# Patient Record
Sex: FEMALE | URBAN - METROPOLITAN AREA
[De-identification: names, ages, dates, MRNs, and addresses within clinical notes are randomized per-mention and may not be internally consistent; named-entity substitution may affect disease eponyms.]

---

## 2023-10-26 ENCOUNTER — OFFICE VISIT (OUTPATIENT)
Dept: ORTHOPEDIC SURGERY | Age: 53
End: 2023-10-26
Payer: COMMERCIAL

## 2023-10-26 DIAGNOSIS — M25.512 LEFT SHOULDER PAIN, UNSPECIFIED CHRONICITY: Primary | ICD-10-CM

## 2023-10-26 DIAGNOSIS — M67.912 ROTATOR CUFF DISORDER, LEFT: ICD-10-CM

## 2023-10-26 PROCEDURE — 99203 OFFICE O/P NEW LOW 30 MIN: CPT | Performed by: ORTHOPAEDIC SURGERY

## 2023-10-26 RX ORDER — ELETRIPTAN HYDROBROMIDE 40 MG/1
TABLET, FILM COATED ORAL
COMMUNITY
Start: 2023-10-23

## 2023-10-26 RX ORDER — SUMATRIPTAN 20 MG/1
20 SPRAY NASAL DAILY PRN
COMMUNITY
Start: 2023-10-18

## 2023-10-27 ENCOUNTER — TELEPHONE (OUTPATIENT)
Dept: ORTHOPEDIC SURGERY | Age: 53
End: 2023-10-27

## 2023-10-27 NOTE — TELEPHONE ENCOUNTER
General Question     Subject: PAPER WORK   Patient and /or Facility Request: Rosalva Gusman  Contact Number: [phone]    PT CALLING IN REGARDING NEEDING PAPER SENT OVER FROM 10- TO HER EMAIL. PT EMAIL WAS INPUT INCORRECTLY ON HER CHART. CORRECT EMAIL: [email or web address]    PLEASE CALL BACK T AT THE ABOVE NUMBER.

## 2023-10-27 NOTE — PROGRESS NOTES
ago.    Impression:  Encounter Diagnoses   Name Primary? Left shoulder pain, unspecified chronicity Yes    Rotator cuff disorder, left        Office Procedures:  Orders Placed This Encounter   Procedures    XR SHOULDER LEFT (MIN 2 VIEWS)     Standing Status:   Future     Number of Occurrences:   1     Standing Expiration Date:   10/26/2024     Order Specific Question:   Reason for exam:     Answer:   left shoulder pain    MRI SHOULDER LEFT WO CONTRAST     Standing Status:   Future     Standing Expiration Date:   10/26/2024     Order Specific Question:   Reason for exam:     Answer:   Proscan       Plan:   We discussed with Navi Christopher that due to the recurrence of her symptoms we will obtain an updated MRI to assess the integrity of her rotator cuff and propagation of her previously discovered rotator cuff tear. Would like to see the patient back in clinic to discuss results of her MRI and develop further plan in roughly 2 weeks. Navi Christopher will follow up in 2 weeks and/or as needed. She was in agreement with this plan and all questions were answered to her satisfaction. She was encouraged to call with any questions. 10/26/2023  8:07 PM      Jazmine Gonzalez DO  Orthopaedic Sports Medicine Fellow    This dictation was performed with a verbal recognition program [facility]) and it was checked for errors. It is possible that there are still dictated errors within this office note. If so, please bring any errors to my attention for an addendum. All efforts were made to ensure that this office note is accurate.  _______________  I was physically present and personally supervised the Orthopaedic Sports Medicine Fellow in the evaluation and development of a treatment plan for this patient. I personally interviewed the patient and performed a physical examination. In addition, I discussed the patient's condition and treatment options with them.  I have also reviewed and agree with the past medical,

## 2023-11-16 ENCOUNTER — OFFICE VISIT (OUTPATIENT)
Dept: ORTHOPEDIC SURGERY | Age: 53
End: 2023-11-16
Payer: COMMERCIAL

## 2023-11-16 VITALS — HEIGHT: 62 IN | BODY MASS INDEX: 29.44 KG/M2 | WEIGHT: 160 LBS

## 2023-11-16 DIAGNOSIS — M75.112 INCOMPLETE TEAR OF LEFT ROTATOR CUFF, UNSPECIFIED WHETHER TRAUMATIC: ICD-10-CM

## 2023-11-16 DIAGNOSIS — M75.22 BICEPS TENDINITIS OF LEFT UPPER EXTREMITY: Primary | ICD-10-CM

## 2023-11-16 PROCEDURE — 99213 OFFICE O/P EST LOW 20 MIN: CPT | Performed by: ORTHOPAEDIC SURGERY

## 2023-11-16 NOTE — PROGRESS NOTES
comfortably in our office in acute distress. Skin:  There are no skin lesions, cellulitis, or extreme edema. The patient has warm and well-perfused Bilateral upper extremities with brisk capillary refill. Eyes: Extra-ocular muscles intact  Mouth: Oral mucosa moist. No perioral lesions  Pulm: Respirations unlabored and regular. Neuro: Alert and oriented x3    Left shoulder Exam:  Inspection: No gross deformities, no signs of infection. Palpation:  There is negative crepitus. there is tenderness of over the rotator cuff footprint, non tender of the bicipital groove, AC joint and posterior glenohumeral humeral joint. Active Range of Motion: Forward elevation of 150, abduction of 140, external rotation with elbow at the side 45, internal rotation to the back is T10     Passive Range of Motion: Deferred     Strength: External rotation with resistance with elbow at the side +4/5, internal rotation with resistance with elbow at the side 5/5, Champagne toast testing +4/5, Jobes test +4/5     Special Tests:no pain over the bicipital groove with CBA,  negative crossarm no Armond muscle deformity. Neurovascular: Sensation to light touch is intact, no motor deficits, palpable radial pulses 2+    Additional Examinations:    Examination of the contralateral extremity does not show any tenderness, deformity or injury. Range of motion is unremarkable. There is no gross instability. There are no rashes, ulcerations or lesions. Strength and tone are normal.      Radiographic:  MRI left shoulder 11/10/2023  CONCLUSION:   1. Supraspinatus focal partial-width full-thickness tear distal critical zone mid fibers 3 x   8mm. Hypertrophic supraspinatus and moderate infraspinatus tendinosis. Associated   subacromial-subdeltoid bursitis. 2. Subacromial osseous and ligamentous impingement contributory. 3. Biceps pulley mechanism insufficiency.   Partially medially subluxed confluent hypertrophic   tendinosis of the

## 2024-03-21 ENCOUNTER — PREP FOR PROCEDURE (OUTPATIENT)
Dept: OPHTHALMOLOGY | Age: 54
End: 2024-03-21

## 2024-03-21 RX ORDER — PROPARACAINE HYDROCHLORIDE 5 MG/ML
1 SOLUTION/ DROPS OPHTHALMIC ONCE
Status: CANCELLED | OUTPATIENT
Start: 2024-03-21 | End: 2024-03-21

## 2024-03-21 RX ORDER — PILOCARPINE HYDROCHLORIDE 10 MG/ML
1 SOLUTION/ DROPS OPHTHALMIC
Status: CANCELLED | OUTPATIENT
Start: 2024-03-21 | End: 2024-03-21

## 2024-03-21 RX ORDER — BRIMONIDINE TARTRATE 2 MG/ML
1 SOLUTION/ DROPS OPHTHALMIC ONCE
Status: CANCELLED | OUTPATIENT
Start: 2024-03-21 | End: 2024-03-21

## 2024-03-21 NOTE — PRE-PROCEDURE INSTRUCTIONS
[address]  [phone]        Pre-Op Phone Call:     Patient Name: Alie Saucedo     Telephone Information:   Mobile [phone]     Home phone:  [phone]    Surgery Time:        Arrival Time:  0645     Left extended Message:  Yes spoke with patient                  Electronically signed by:  Aleshia Walker RN at 3/21/2024 9:42 AM

## 2024-03-22 ENCOUNTER — HOSPITAL ENCOUNTER (OUTPATIENT)
Dept: SURGERY | Age: 54
Discharge: HOME | End: 2024-03-22
Payer: COMMERCIAL

## 2024-03-22 VITALS — SYSTOLIC BLOOD PRESSURE: 134 MMHG | DIASTOLIC BLOOD PRESSURE: 99 MMHG

## 2024-03-22 PROCEDURE — 2500000003 HC RX 250 WO HCPCS: Performed by: OPHTHALMOLOGY

## 2024-03-22 PROCEDURE — 66821 AFTER CATARACT LASER SURGERY: CPT

## 2024-03-22 PROCEDURE — 66761 REVISION OF IRIS: CPT

## 2024-03-22 PROCEDURE — 6370000000 HC RX 637 (ALT 250 FOR IP): Performed by: OPHTHALMOLOGY

## 2024-03-22 RX ORDER — PROPARACAINE HYDROCHLORIDE 5 MG/ML
1 SOLUTION/ DROPS OPHTHALMIC ONCE
Status: COMPLETED | OUTPATIENT
Start: 2024-03-22 | End: 2024-03-22

## 2024-03-22 RX ORDER — BRIMONIDINE TARTRATE 2 MG/ML
1 SOLUTION/ DROPS OPHTHALMIC ONCE
Status: COMPLETED | OUTPATIENT
Start: 2024-03-22 | End: 2024-03-22

## 2024-03-22 RX ORDER — PILOCARPINE HYDROCHLORIDE 10 MG/ML
1 SOLUTION/ DROPS OPHTHALMIC
Status: COMPLETED | OUTPATIENT
Start: 2024-03-22 | End: 2024-03-22

## 2024-03-22 RX ADMIN — PILOCARPINE HYDROCHLORIDE 1 DROP: 10 SOLUTION/ DROPS OPHTHALMIC at 07:47

## 2024-03-22 RX ADMIN — BRIMONIDINE TARTRATE 1 DROP: 2 SOLUTION OPHTHALMIC at 08:25

## 2024-03-22 RX ADMIN — PILOCARPINE HYDROCHLORIDE 1 DROP: 10 SOLUTION/ DROPS OPHTHALMIC at 07:48

## 2024-03-22 RX ADMIN — PILOCARPINE HYDROCHLORIDE 1 DROP: 10 SOLUTION/ DROPS OPHTHALMIC at 07:49

## 2024-03-22 RX ADMIN — PROPARACAINE HYDROCHLORIDE 1 DROP: 5 SOLUTION/ DROPS OPHTHALMIC at 07:47

## 2024-03-22 NOTE — PROGRESS NOTES
Patient: Alie Saucedo  1970, 53 y.o./female    Ambulatory to laser room.  Left eye marked and numbed by Dr. Walsh.  Laser procedure done and tolerated well by patient.  Post  instructions given to Alie by provider.      Power setting was 3.5        # of shots was 48    Total power was 0.16 J    Paulette Alamo RN

## 2024-03-22 NOTE — OP NOTE
[facility]  [address]  [address]   [place] [address]  [phone]      Name:  Alie Saucedo  :  1970    Age:   53 y.o.  Medical Record Number:  [id number]  Date of Procedure:  3/22/2024    Preoperative diagnosis: Anatomically Narrow Angle left eye  Postoperative diagnosis: Same  Procedure: YAG Laser Iridotomy left eye  Surgeon: Emily Walsh MD   Anesthesia: Topical  Estimated Blood Loss: None  Specimens removed: None  Implants: None  Complications:None    Indications for procedure:   The patient has a history of anatomically narrow angle which appears occludable.  The patient is at risk of an acute angle closure glaucoma attack as well as chronic angle closure glaucoma changes.   Laser peripheral iridotomy was recommended to address the anatomically narrow angle.  Following discussion of all risks, benefits, and alternatives, the patient agreed to have the procedure done.   Informed consent was signed.      Operative Procedure:  The patient was taken to the laser suite at [facility] where the operative site was confirmed.  Topical ophthalmic anesthesia was instilled in the operative eye followed by brimonidine and pilocarpine.     In the laser room 2 timeouts were performed to verify the correct procedure, eye and patient.    An iridotomy lens with goniosol was placed on the operative eye.  The YAG laser was used to treat the peripheral iris with 48 spots using a power of 3.8 mJ.    The total power used was 0.16 J.    The iridotomy appeared nicely patent following the procedure.    The patient was given the post operative drops and asked to return to clinic in 1-2 hours for an intraocular pressure check.          Electronically signed by: Emily Walsh MD,3/22/2024,8:32 AM

## 2024-03-22 NOTE — DISCHARGE INSTRUCTIONS
[facility]   [address], [place] [address]   [phone]     HOME CARE INSTRUCTIONS FOLLOWING LASER PROCEDURES         @ED@    Patient Name: Alie Saucedo  : 1970  MRN: [id number]      Notify your physician if you have rash, hives, difficulty breathing, or severe nausea or vomiting.  Contact your family physician for questions concerning your current home medications.  If pain intensifies or pain is unrelieved with pain medicine as ordered, call your physician.  If physician is unavailable, go to the Emergency Room.    ADDITIONAL INSTRUCTIONS     Rest today       Report any decreased vision or increased pain.       Call the physician’s office to schedule your post op visit in: 2-3 weeks.    Medications: ***      You should have no severe pain after surgery. You may take Tylenol   (acetaminophen) for discomfort as needed as long as you do not have any liver problems. Your eye may feel irritated or scratchy. If pain becomes severe, call the doctor’s office immediately.  CALL THE OFFICE IMMEDIATELY IF YOU EXPERIENCE ANY OF THE FOLLOWING:  Sudden decrease in vision, severe pain, redness, discharge, new floaters, flashes of light, or veil over a portion of your vision.      Patient and/or responsible party verbalizes understanding of above instructions.    Tylenol is usually sufficient for any discomfort that you may feel.    It is not uncommon for you to experience the sensation of a large “floater” in your vision. If you experience hundreds of thousands of floaters that do not stop, flashing or arcing lights that do not stop, or a curtain or veil of blackness that you cannot see through, that does not go away, please call the office at [phone] or Toll Free [phone].     THERE IS AN EYE PHYSICIAN ON CALL 24 HOURS A DAY, SEVEN DAYS A WEEK--CALL FOR ANY QUESTIONS/PROBLEMS